# Patient Record
Sex: MALE | HISPANIC OR LATINO | URBAN - METROPOLITAN AREA
[De-identification: names, ages, dates, MRNs, and addresses within clinical notes are randomized per-mention and may not be internally consistent; named-entity substitution may affect disease eponyms.]

---

## 2018-08-21 ENCOUNTER — OFFICE VISIT (OUTPATIENT)
Dept: URBAN - METROPOLITAN AREA CLINIC 48 | Facility: CLINIC | Age: 73
End: 2018-08-21
Payer: MEDICARE

## 2018-08-21 DIAGNOSIS — H10.89 OTHER CONJUNCTIVITIS: Primary | ICD-10-CM

## 2018-08-21 PROCEDURE — 92014 COMPRE OPH EXAM EST PT 1/>: CPT | Performed by: OPHTHALMOLOGY

## 2018-08-21 ASSESSMENT — INTRAOCULAR PRESSURE
OD: 16
OS: 15

## 2018-08-21 NOTE — IMPRESSION/PLAN
Impression: Other secondary cataract, bilateral: H26.493. Plan: will address in 1 week when conjunctivitis resolved.

## 2018-08-21 NOTE — IMPRESSION/PLAN
Impression: Other conjunctivitis: H10.89.  Plan: Patient to stop herbal tea, stat PF lubricating drops 2-4 times a  day OU, warm compresses 5min a day 

rtc 1 week

## 2018-08-29 ENCOUNTER — OFFICE VISIT (OUTPATIENT)
Dept: URBAN - METROPOLITAN AREA CLINIC 48 | Facility: CLINIC | Age: 73
End: 2018-08-29
Payer: MEDICARE

## 2018-08-29 DIAGNOSIS — H26.493 OTHER SECONDARY CATARACT, BILATERAL: ICD-10-CM

## 2018-08-29 PROCEDURE — 92012 INTRM OPH EXAM EST PATIENT: CPT | Performed by: OPHTHALMOLOGY

## 2018-08-29 RX ORDER — CARBOXYMETHYLCELLULOSE SODIUM AND GLYCERIN 5; 9 MG/ML; MG/ML
SOLUTION/ DROPS OPHTHALMIC
Qty: 0 | Refills: 0 | Status: ACTIVE
Start: 2018-08-29

## 2018-08-29 ASSESSMENT — INTRAOCULAR PRESSURE
OD: 16
OS: 18

## 2018-08-29 NOTE — IMPRESSION/PLAN
Impression: Other secondary cataract, bilateral: H26.493. Plan: Risks, benefits, alternatives, and expectations of YAG capsulotomy were discussed. The patient desires a YAG capsulotomy in both eyes. Schedule YAG in both eye right the left eye.

## 2018-10-04 ENCOUNTER — SURGERY (OUTPATIENT)
Dept: URBAN - METROPOLITAN AREA SURGERY 26 | Facility: SURGERY | Age: 73
End: 2018-10-04
Payer: MEDICARE

## 2018-10-04 PROCEDURE — 66821 AFTER CATARACT LASER SURGERY: CPT | Performed by: OPHTHALMOLOGY

## 2018-10-18 ENCOUNTER — SURGERY (OUTPATIENT)
Dept: URBAN - METROPOLITAN AREA SURGERY 26 | Facility: SURGERY | Age: 73
End: 2018-10-18
Payer: MEDICARE

## 2018-10-18 PROCEDURE — 66821 AFTER CATARACT LASER SURGERY: CPT | Performed by: OPHTHALMOLOGY

## 2018-10-25 ENCOUNTER — POST-OPERATIVE VISIT (OUTPATIENT)
Dept: URBAN - METROPOLITAN AREA CLINIC 48 | Facility: CLINIC | Age: 73
End: 2018-10-25
Payer: MEDICARE

## 2018-10-25 PROCEDURE — 99024 POSTOP FOLLOW-UP VISIT: CPT | Performed by: OPTOMETRIST

## 2018-10-25 ASSESSMENT — INTRAOCULAR PRESSURE
OD: 15
OS: 16

## 2018-10-25 NOTE — IMPRESSION/PLAN
Impression: Encntr for f/u exam aft trtmt for cond oth than malig neoplm: Z09. S/P YAG CAP doing well. R/C 3-4 months.  Plan: RTC 3-4 months

## 2019-01-24 ENCOUNTER — OFFICE VISIT (OUTPATIENT)
Dept: URBAN - METROPOLITAN AREA CLINIC 48 | Facility: CLINIC | Age: 74
End: 2019-01-24
Payer: COMMERCIAL

## 2019-01-24 DIAGNOSIS — H11.002 PTERYGIUM OF LEFT EYE: Primary | ICD-10-CM

## 2019-01-24 PROCEDURE — 92014 COMPRE OPH EXAM EST PT 1/>: CPT | Performed by: OPHTHALMOLOGY

## 2019-01-24 ASSESSMENT — INTRAOCULAR PRESSURE
OS: 13
OD: 15

## 2019-01-24 NOTE — IMPRESSION/PLAN
Impression: Pterygium of left eye: H11.002. Patient is symptomatic Plan: Discussed diagnosis in detail with patient. Advised patient of condition. No treatment is required at this time. Patient instructed to use artificial tears as needed. Will continue to observe condition and or symptoms. Patient to advices clinic of any changes prior to time of return. 

Patient to use  use:  AFT 1-4 times a  day OU

RTC 2 months with Carlos and OCT ON

## 2019-10-16 ENCOUNTER — OFFICE VISIT (OUTPATIENT)
Dept: URBAN - METROPOLITAN AREA CLINIC 48 | Facility: CLINIC | Age: 74
End: 2019-10-16
Payer: COMMERCIAL

## 2019-10-16 DIAGNOSIS — H40.013 OPEN ANGLE WITH BORDERLINE FINDINGS, LOW RISK, BILATERAL: ICD-10-CM

## 2019-10-16 PROCEDURE — 92133 CPTRZD OPH DX IMG PST SGM ON: CPT | Performed by: OPHTHALMOLOGY

## 2019-10-16 PROCEDURE — 92025 CPTRIZED CORNEAL TOPOGRAPHY: CPT | Performed by: OPHTHALMOLOGY

## 2019-10-16 PROCEDURE — 92014 COMPRE OPH EXAM EST PT 1/>: CPT | Performed by: OPHTHALMOLOGY

## 2019-10-16 ASSESSMENT — INTRAOCULAR PRESSURE
OS: 11
OD: 19

## 2019-10-16 NOTE — IMPRESSION/PLAN
Impression: Pterygium of left eye: H11.002. Patient is symptomatic Plan: Discussed diagnosis in detail with patient. Advised patient of condition. Risks, benefits, alternatives and expectations of pterygium surgery discussed. The patient desires pterygium surgery in the left   eye. 

schedule pterygium surgery  left eye, RL 2, angela C for 30 seconds  and graft.

## 2019-10-16 NOTE — IMPRESSION/PLAN
Impression: Open angle with borderline findings, low risk, bilateral: H40.013. Plan: Continue to monitor off drops.   

RTC 6 months IOP check (short exam) Dr. Raymond Spencer or Dr. Haley Patricio

## 2019-10-31 ENCOUNTER — SURGERY (OUTPATIENT)
Dept: URBAN - METROPOLITAN AREA SURGERY 26 | Facility: SURGERY | Age: 74
End: 2019-10-31
Payer: COMMERCIAL

## 2019-10-31 PROCEDURE — 65426 REMOVAL OF EYE LESION: CPT | Performed by: OPHTHALMOLOGY

## 2019-11-01 ENCOUNTER — POST-OPERATIVE VISIT (OUTPATIENT)
Dept: URBAN - METROPOLITAN AREA CLINIC 48 | Facility: CLINIC | Age: 74
End: 2019-11-01

## 2019-11-01 PROCEDURE — 99024 POSTOP FOLLOW-UP VISIT: CPT | Performed by: OPHTHALMOLOGY

## 2019-11-01 RX ORDER — NEOMYCIN, POLYMYXIN B SULFATES, DEXAMETHASONE 1; 3.5; 1 MG/G; MG/G; [USP'U]/G
OINTMENT OPHTHALMIC
Qty: 1 | Refills: 2 | Status: INACTIVE
Start: 2019-11-01 | End: 2021-04-05

## 2019-11-01 RX ORDER — OFLOXACIN 3 MG/ML
0.3 % SOLUTION/ DROPS OPHTHALMIC
Qty: 1 | Refills: 1 | Status: INACTIVE
Start: 2019-11-01 | End: 2021-04-05

## 2019-11-01 RX ORDER — PREDNISOLONE ACETATE 10 MG/ML
1 % SUSPENSION/ DROPS OPHTHALMIC
Qty: 1 | Refills: 0 | Status: INACTIVE
Start: 2019-11-01 | End: 2021-04-05

## 2019-11-01 ASSESSMENT — INTRAOCULAR PRESSURE: OD: 15

## 2019-11-05 ENCOUNTER — POST-OPERATIVE VISIT (OUTPATIENT)
Dept: URBAN - METROPOLITAN AREA CLINIC 48 | Facility: CLINIC | Age: 74
End: 2019-11-05

## 2019-11-05 DIAGNOSIS — Z09 ENCNTR FOR F/U EXAM AFT TRTMT FOR COND OTH THAN MALIG NEOPLM: Primary | ICD-10-CM

## 2019-11-05 PROCEDURE — 99024 POSTOP FOLLOW-UP VISIT: CPT | Performed by: OPTOMETRIST

## 2019-11-05 ASSESSMENT — INTRAOCULAR PRESSURE
OS: 16
OD: 18

## 2019-11-13 ENCOUNTER — POST-OPERATIVE VISIT (OUTPATIENT)
Dept: URBAN - METROPOLITAN AREA CLINIC 48 | Facility: CLINIC | Age: 74
End: 2019-11-13

## 2019-11-13 PROCEDURE — 99024 POSTOP FOLLOW-UP VISIT: CPT | Performed by: OPTOMETRIST

## 2019-11-13 ASSESSMENT — INTRAOCULAR PRESSURE
OD: 18
OS: 17

## 2021-04-05 ENCOUNTER — OFFICE VISIT (OUTPATIENT)
Dept: URBAN - METROPOLITAN AREA CLINIC 48 | Facility: CLINIC | Age: 76
End: 2021-04-05
Payer: COMMERCIAL

## 2021-04-05 DIAGNOSIS — H53.10 SUBJECTIVE VISUAL DISTURBANCE: Primary | ICD-10-CM

## 2021-04-05 PROCEDURE — 99214 OFFICE O/P EST MOD 30 MIN: CPT | Performed by: OPTOMETRIST

## 2021-04-05 ASSESSMENT — INTRAOCULAR PRESSURE
OS: 12
OD: 18

## 2021-04-05 NOTE — IMPRESSION/PLAN
Impression: Subjective visual disturbance: H53.10. Plan: Likely cause is change in refraction. With dull reflex would like to eval macula. Discussed with patient. Advised to use warm compress, and Artificial Tears BID (systane sample provided in office) until next appointment. RTC 2-3 weeks for DFE/OCT mac/ARX/MRx

## 2021-04-19 ENCOUNTER — OFFICE VISIT (OUTPATIENT)
Dept: URBAN - METROPOLITAN AREA CLINIC 48 | Facility: CLINIC | Age: 76
End: 2021-04-19
Payer: COMMERCIAL

## 2021-04-19 PROCEDURE — 99214 OFFICE O/P EST MOD 30 MIN: CPT | Performed by: OPTOMETRIST

## 2021-04-19 PROCEDURE — 92134 CPTRZ OPH DX IMG PST SGM RTA: CPT | Performed by: OPTOMETRIST

## 2021-04-19 ASSESSMENT — INTRAOCULAR PRESSURE
OS: 13
OD: 16

## 2021-04-19 NOTE — IMPRESSION/PLAN
Impression: Macular cyst, hole, or pseudohole, left eye: H35.342. Plan: Confirmed on OCT today. Recommend update in refraction. Will continue to monitor.  

RTC: 2-3 months for DFE/OCT mac with Dr. Tejas Willett

## 2021-08-12 ENCOUNTER — OFFICE VISIT (OUTPATIENT)
Dept: URBAN - METROPOLITAN AREA CLINIC 48 | Facility: CLINIC | Age: 76
End: 2021-08-12
Payer: COMMERCIAL

## 2021-08-12 DIAGNOSIS — H35.342 MACULAR CYST, HOLE, OR PSEUDOHOLE, LEFT EYE: Primary | ICD-10-CM

## 2021-08-12 PROCEDURE — 92014 COMPRE OPH EXAM EST PT 1/>: CPT | Performed by: OPHTHALMOLOGY

## 2021-08-12 ASSESSMENT — INTRAOCULAR PRESSURE
OS: 14
OD: 15

## 2021-08-12 NOTE — IMPRESSION/PLAN
Impression: Macular cyst, hole, or pseudohole, left eye: H35.342. Plan: Continue to monitor.  

RTC: 6 months for DFE/OCT mac with Dr. Tejas Willett

## 2021-08-12 NOTE — IMPRESSION/PLAN
Impression: Dry eye syndrome of bilateral lacrimal glands: H04.123. Plan: Brand name AFT's recommend and warm compresses. Patient knows to rtc if no improvement.

## 2022-03-23 ENCOUNTER — OFFICE VISIT (OUTPATIENT)
Dept: URBAN - METROPOLITAN AREA CLINIC 48 | Facility: CLINIC | Age: 77
End: 2022-03-23
Payer: COMMERCIAL

## 2022-03-23 DIAGNOSIS — H11.001 PTERYGIUM OF RIGHT EYE: ICD-10-CM

## 2022-03-23 DIAGNOSIS — H04.123 DRY EYE SYNDROME OF BILATERAL LACRIMAL GLANDS: ICD-10-CM

## 2022-03-23 PROCEDURE — 92014 COMPRE OPH EXAM EST PT 1/>: CPT | Performed by: OPHTHALMOLOGY

## 2022-03-23 PROCEDURE — 92134 CPTRZ OPH DX IMG PST SGM RTA: CPT | Performed by: OPHTHALMOLOGY

## 2022-03-23 ASSESSMENT — INTRAOCULAR PRESSURE
OD: 18
OS: 14

## 2022-03-23 NOTE — IMPRESSION/PLAN
Impression: Pterygium of right eye: H11.001. Plan: Discussed diagnosis in detail with patient. Advised patient of condition. No treatment is required at this time. Patient instructed to use artificial tears as needed. Will continue to observe condition and or symptoms. Patient to advices clinic of any changes prior to time of return. 


RTC  6 months follow up

## 2022-03-23 NOTE — IMPRESSION/PLAN
Impression: Macular cyst, hole, or pseudohole, left eye: H35.342.  Plan: Refer to Dr. Barajas Estimable  next available

## 2022-03-23 NOTE — IMPRESSION/PLAN
Impression: Dry eye syndrome of bilateral lacrimal glands: H04.123. Plan: Patient complains  of glare and headlights, patient has open PC. Recommend AFT . Patient may try: 
- Genteal
- Blink - Refresh - Syatane RTC 6 months follow up

## 2022-06-01 ENCOUNTER — OFFICE VISIT (OUTPATIENT)
Dept: URBAN - METROPOLITAN AREA CLINIC 48 | Facility: CLINIC | Age: 77
End: 2022-06-01
Payer: COMMERCIAL

## 2022-06-01 DIAGNOSIS — H35.342 MACULAR CYST, HOLE, OR PSEUDOHOLE, LEFT EYE: ICD-10-CM

## 2022-06-01 DIAGNOSIS — H43.813 VITREOUS DEGENERATION, BILATERAL: ICD-10-CM

## 2022-06-01 DIAGNOSIS — H35.372 PUCKERING OF MACULA, LEFT EYE: Primary | ICD-10-CM

## 2022-06-01 PROCEDURE — 99214 OFFICE O/P EST MOD 30 MIN: CPT | Performed by: OPHTHALMOLOGY

## 2022-06-01 PROCEDURE — 92134 CPTRZ OPH DX IMG PST SGM RTA: CPT | Performed by: OPHTHALMOLOGY

## 2022-06-01 ASSESSMENT — INTRAOCULAR PRESSURE
OD: 12
OS: 12

## 2022-06-01 NOTE — IMPRESSION/PLAN
Impression: Puckering of macula, left eye: H35.372. Left. OCT:
OD: wnl OS: ERM, pseudohole Plan: Examination and OCT reveals an ERM which is distorting the macular contour. The diagnosis, natural history, and prognosis of epiretinal membranes, as well as the risks and benefits of vitrectomy with membrane peeling versus observation were discussed at length. Given the patient's current visual acuity and hindrance on activities of daily living, surgical correction was recommended. The patient understands that while the distortion should marlo, the final acuity, which can take months to achieve, may or may not be an improvement over baseline. 

Rec: 25g PPV, ERM-ILM peel OS x ERM

## 2022-06-29 ENCOUNTER — POST-OPERATIVE VISIT (OUTPATIENT)
Dept: URBAN - METROPOLITAN AREA CLINIC 48 | Facility: CLINIC | Age: 77
End: 2022-06-29
Payer: COMMERCIAL

## 2022-06-29 DIAGNOSIS — H35.372 PUCKERING OF MACULA, LEFT EYE: Primary | ICD-10-CM

## 2022-06-29 PROCEDURE — 99024 POSTOP FOLLOW-UP VISIT: CPT | Performed by: OPHTHALMOLOGY

## 2022-06-29 ASSESSMENT — INTRAOCULAR PRESSURE: OS: 13

## 2022-06-29 NOTE — IMPRESSION/PLAN
Impression: S/P PPVX/ ILM PEEL/ ERM OS - 1 Day. Puckering of macula, left eye  H35.372. Plan: PF/Oflox QID. Gas precautions. Sleep on side.  

RTC 1-2 weeks DFE OS

## 2022-07-11 ENCOUNTER — POST-OPERATIVE VISIT (OUTPATIENT)
Dept: URBAN - METROPOLITAN AREA CLINIC 48 | Facility: CLINIC | Age: 77
End: 2022-07-11
Payer: COMMERCIAL

## 2022-07-11 DIAGNOSIS — H35.372 PUCKERING OF MACULA, LEFT EYE: ICD-10-CM

## 2022-07-11 DIAGNOSIS — H35.342 MACULAR CYST, HOLE, OR PSEUDOHOLE, LEFT EYE: Primary | ICD-10-CM

## 2022-07-11 PROCEDURE — 99024 POSTOP FOLLOW-UP VISIT: CPT | Performed by: OPHTHALMOLOGY

## 2022-07-11 ASSESSMENT — INTRAOCULAR PRESSURE
OD: 14
OS: 12

## 2022-07-11 NOTE — IMPRESSION/PLAN
Impression: S/P PPVX/ ILM PEEL/ ERM OS - 13 Days. Macular cyst, hole, or pseudohole, left eye  H35.342. Excellent post op course   Post operative instructions reviewed - Condition is improving - Plan: Taper drops. RTC 1 month DFE OS OCT OS --Advised patient to use artificial tears for comfort. --Discontinue Ofloxacin 0.3%--Taper Prednisolone acetate 1% TID x 1 wk, BID x 1wk, QD x 1wk, then d/c

## 2022-08-08 ENCOUNTER — POST-OPERATIVE VISIT (OUTPATIENT)
Dept: URBAN - METROPOLITAN AREA CLINIC 48 | Facility: CLINIC | Age: 77
End: 2022-08-08
Payer: COMMERCIAL

## 2022-08-08 DIAGNOSIS — H35.372 PUCKERING OF MACULA, LEFT EYE: Primary | ICD-10-CM

## 2022-08-08 PROCEDURE — 99024 POSTOP FOLLOW-UP VISIT: CPT | Performed by: OPHTHALMOLOGY

## 2022-08-08 ASSESSMENT — INTRAOCULAR PRESSURE
OD: 16
OS: 11

## 2022-08-08 NOTE — IMPRESSION/PLAN
Impression: S/P PPVX/ ILM PEEL/ ERM OS - 41 Days. Puckering of macula, left eye  H35.372. Plan: Doing well. Off drops. [id number] [address] for [facility] RTC 3 months DFE/OCT OU re-eval

## 2022-09-27 ENCOUNTER — OFFICE VISIT (OUTPATIENT)
Dept: URBAN - METROPOLITAN AREA CLINIC 48 | Facility: CLINIC | Age: 77
End: 2022-09-27
Payer: COMMERCIAL

## 2022-09-27 DIAGNOSIS — H35.372 PUCKERING OF MACULA, LEFT EYE: ICD-10-CM

## 2022-09-27 DIAGNOSIS — H11.001 PTERYGIUM OF RIGHT EYE: Primary | ICD-10-CM

## 2022-09-27 PROCEDURE — 99213 OFFICE O/P EST LOW 20 MIN: CPT | Performed by: OPHTHALMOLOGY

## 2022-09-27 ASSESSMENT — INTRAOCULAR PRESSURE
OD: 13
OS: 12

## 2022-09-27 NOTE — IMPRESSION/PLAN
Impression: Pterygium of right eye: H11.001. Plan: Discussed diagnosis in detail with patient. Advised patient of condition. No treatment is required at this time. Patient instructed to use artificial tears as needed. Will continue to observe condition and or symptoms. Patient to advices clinic of any changes prior to time of return. 
Continue: AFT 1-4x daily OU

RTC 6mo Pterygium Follow Up

## 2022-09-27 NOTE — IMPRESSION/PLAN
Impression: Puckering of macula, left eye: H35.372. Left. OCT:
OD: wnl OS: ERM, pseudohole Plan: Keep appointments with Dr. Stella Shook as scheduled

## 2023-03-28 ENCOUNTER — OFFICE VISIT (OUTPATIENT)
Dept: URBAN - METROPOLITAN AREA CLINIC 48 | Facility: CLINIC | Age: 78
End: 2023-03-28
Payer: COMMERCIAL

## 2023-03-28 DIAGNOSIS — H35.372 PUCKERING OF MACULA, LEFT EYE: Primary | ICD-10-CM

## 2023-03-28 DIAGNOSIS — H11.001 PTERYGIUM OF RIGHT EYE: ICD-10-CM

## 2023-03-28 PROCEDURE — 92134 CPTRZ OPH DX IMG PST SGM RTA: CPT | Performed by: OPHTHALMOLOGY

## 2023-03-28 PROCEDURE — 99214 OFFICE O/P EST MOD 30 MIN: CPT | Performed by: OPHTHALMOLOGY

## 2023-03-28 ASSESSMENT — INTRAOCULAR PRESSURE
OS: 16
OD: 17

## 2023-03-28 NOTE — IMPRESSION/PLAN
Impression: Puckering of macula, left eye: H35.372. Plan: Stable, can see Dr. Nyasia Watkins as needed Patient would like to come back as needed

## 2023-03-28 NOTE — IMPRESSION/PLAN
Impression: Pterygium of right eye: H11.001. Plan: Has only slightly grown since last visit now @ 1.9mm. No treatment is required at this time. Patient instructed to use artificial tears as needed. Will continue to observe condition and or symptoms. Patient to advices clinic of any changes prior to time of return. 
Continue: AFT 1-4x daily OU

RTC 12mo Pterygium Follow Up

## 2024-03-28 ENCOUNTER — OFFICE VISIT (OUTPATIENT)
Dept: URBAN - METROPOLITAN AREA CLINIC 48 | Facility: CLINIC | Age: 79
End: 2024-03-28
Payer: COMMERCIAL

## 2024-03-28 DIAGNOSIS — H11.001 PTERYGIUM OF RIGHT EYE: ICD-10-CM

## 2024-03-28 DIAGNOSIS — H35.372 PUCKERING OF MACULA, LEFT EYE: Primary | ICD-10-CM

## 2024-03-28 PROCEDURE — 92134 CPTRZ OPH DX IMG PST SGM RTA: CPT | Performed by: OPHTHALMOLOGY

## 2024-03-28 PROCEDURE — 99214 OFFICE O/P EST MOD 30 MIN: CPT | Performed by: OPHTHALMOLOGY

## 2024-03-28 ASSESSMENT — INTRAOCULAR PRESSURE
OS: 13
OD: 14